# Patient Record
Sex: FEMALE | Race: WHITE | URBAN - METROPOLITAN AREA
[De-identification: names, ages, dates, MRNs, and addresses within clinical notes are randomized per-mention and may not be internally consistent; named-entity substitution may affect disease eponyms.]

---

## 2017-06-21 ENCOUNTER — APPOINTMENT (RX ONLY)
Dept: URBAN - METROPOLITAN AREA CLINIC 13 | Facility: CLINIC | Age: 72
Setting detail: DERMATOLOGY
End: 2017-06-21

## 2017-06-21 VITALS
HEIGHT: 67 IN | SYSTOLIC BLOOD PRESSURE: 104 MMHG | WEIGHT: 128 LBS | HEART RATE: 85 BPM | DIASTOLIC BLOOD PRESSURE: 70 MMHG

## 2017-06-21 DIAGNOSIS — L72.0 EPIDERMAL CYST: ICD-10-CM

## 2017-06-21 PROCEDURE — ? COUNSELING

## 2017-06-21 PROCEDURE — 11900 INJECT SKIN LESIONS </W 7: CPT

## 2017-06-21 PROCEDURE — 10060 I&D ABSCESS SIMPLE/SINGLE: CPT

## 2017-06-21 PROCEDURE — ? INTRALESIONAL KENALOG

## 2017-06-21 PROCEDURE — ? INCISION AND DRAINAGE

## 2017-06-21 ASSESSMENT — LOCATION ZONE DERM: LOCATION ZONE: NECK

## 2017-06-21 ASSESSMENT — LOCATION DETAILED DESCRIPTION DERM: LOCATION DETAILED: MID POSTERIOR NECK

## 2017-06-21 ASSESSMENT — LOCATION SIMPLE DESCRIPTION DERM: LOCATION SIMPLE: POSTERIOR NECK

## 2017-06-21 NOTE — PROCEDURE: MIPS QUALITY
Detail Level: Detailed
Quality 431: Preventive Care And Screening: Unhealthy Alcohol Use - Screening: Patient screened for unhealthy alcohol use using a single question and scores less than 2 times per year
Quality 47: Advance Care Plan: Advance Care Planning discussed and documented; advance care plan or surrogate decision maker documented in the medical record.
Quality 111:Pneumonia Vaccination Status For Older Adults: Pneumococcal Vaccination Previously Received
Quality 110: Preventive Care And Screening: Influenza Immunization: Influenza Immunization Administered during Influenza season
Quality 226: Preventive Care And Screening: Tobacco Use: Screening And Cessation Intervention: Patient screened for tobacco and is an ex-smoker
Quality 128: Preventive Care And Screening: Body Mass Index (Bmi) Screening And Follow-Up Plan: BMI is documented within normal parameters and no follow-up plan is required.

## 2017-06-21 NOTE — PROCEDURE: INTRALESIONAL KENALOG
Concentration Of Kenalog Solution Injected (Mg/Ml): 2.5
Expiration Date For Kenalog (Optional): 08/2018
Administered By (Optional): Dr. Snider
Medical Necessity Clause: This procedure was medically necessary because the lesions that were treated were:
Lot # For Kenalog (Optional): [id number]
Total Volume (Ccs): 0.5
Consent: The risks of atrophy were reviewed with the patient.
Kenalog Preparation: Kenalog
Include Z78.9 (Other Specified Conditions Influencing Health Status) As An Associated Diagnosis?: No
Detail Level: Detailed
X Size Of Lesion In Cm (Optional): 0

## 2017-06-21 NOTE — PROCEDURE: INCISION AND DRAINAGE
Suture Text: The incision was partially closed with
Detail Level: Detailed
Wound Care: Bacitracin
Lesion Type: Cyst
Size Of Lesion In Cm (Optional But May Be Required For Some Insurances): 0.8
Include Sutures?: No
Drainage Type?: bloody
Consent was obtained and risks were reviewed including but not limited to delayed wound healing, infection, need for multiple I and D's, and pain.
Dressing: pressure dressing with telfa
Epidermal Closure: simple interrupted
Render Postcare In Note?: Yes
Preparation Text: The area was prepped in the usual clean fashion.
Post-Care Instructions: I reviewed with the patient in detail post-care instructions. Leave bandage in place for 48 hours, then remove. Get area wet in shower and pull packing. Wash gently with antibacterial soap and water, pat dry and apply antibiotic ointment, cover with bandage. Repeat daily until healed. Call the office should any redness, pain, swelling or worsening occur.
Epidermal Sutures: 4-0 Ethilon
Method: 15 blade
Anesthesia Type: 1% lidocaine with epinephrine
Anesthesia Volume In Cc: 2

## 2017-06-21 NOTE — HPI: SKIN LESION
How Severe Is Your Skin Lesion?: mild
Has Your Skin Lesion Been Treated?: not been treated
Is This A New Presentation, Or A Follow-Up?: Skin Lesion
Additional History: \\n\\nPatient was prescribed cephalexin by Dr. Garcia 2 weeks ago, took it for 1 weeks.

## 2017-08-17 ENCOUNTER — APPOINTMENT (RX ONLY)
Dept: URBAN - METROPOLITAN AREA CLINIC 13 | Facility: CLINIC | Age: 72
Setting detail: DERMATOLOGY
End: 2017-08-17

## 2017-08-17 DIAGNOSIS — L72.0 EPIDERMAL CYST: ICD-10-CM

## 2017-08-17 PROCEDURE — 11421 EXC H-F-NK-SP B9+MARG 0.6-1: CPT

## 2017-08-17 PROCEDURE — ? PRESCRIPTION

## 2017-08-17 PROCEDURE — ? EXCISION

## 2017-08-17 RX ORDER — MUPIROCIN 20 MG/G
QS OINTMENT TOPICAL BID
Qty: 1 | Refills: 1 | COMMUNITY
Start: 2017-08-17

## 2017-08-17 RX ORDER — CHLORHEXIDINE GLUCONATE 213 G/1000ML
QS SOLUTION TOPICAL BID
Qty: 1 | Refills: 1 | COMMUNITY
Start: 2017-08-17

## 2017-08-17 RX ORDER — HYDROCODONE BITATRATE AND ACETAMINOPHEN 5; 325 MG/1; MG/1
1 TABLET ORAL PRN
Qty: 15 | Refills: 0 | COMMUNITY
Start: 2017-08-17

## 2017-08-17 RX ADMIN — CHLORHEXIDINE GLUCONATE QS: 213 SOLUTION TOPICAL at 14:23

## 2017-08-17 RX ADMIN — MUPIROCIN QS: 20 OINTMENT TOPICAL at 14:23

## 2017-08-17 RX ADMIN — HYDROCODONE BITATRATE AND ACETAMINOPHEN 1: 5; 325 TABLET ORAL at 14:23

## 2017-08-17 ASSESSMENT — LOCATION ZONE DERM: LOCATION ZONE: NECK

## 2017-08-17 ASSESSMENT — LOCATION SIMPLE DESCRIPTION DERM: LOCATION SIMPLE: POSTERIOR NECK

## 2017-08-17 ASSESSMENT — LOCATION DETAILED DESCRIPTION DERM: LOCATION DETAILED: MID POSTERIOR NECK

## 2017-08-17 NOTE — PROCEDURE: MIPS QUALITY
Detail Level: Detailed
Quality 111:Pneumonia Vaccination Status For Older Adults: Pneumococcal Vaccination Previously Received
Quality 431: Preventive Care And Screening: Unhealthy Alcohol Use - Screening: Patient screened for unhealthy alcohol use using a single question and scores less than 2 times per year
Quality 47: Advance Care Plan: Advance Care Planning discussed and documented; advance care plan or surrogate decision maker documented in the medical record.
Quality 110: Preventive Care And Screening: Influenza Immunization: Influenza Immunization Administered during Influenza season
Quality 128: Preventive Care And Screening: Body Mass Index (Bmi) Screening And Follow-Up Plan: BMI is documented within normal parameters and no follow-up plan is required.
Quality 226: Preventive Care And Screening: Tobacco Use: Screening And Cessation Intervention: Patient screened for tobacco and is an ex-smoker

## 2017-08-25 ENCOUNTER — APPOINTMENT (RX ONLY)
Dept: URBAN - METROPOLITAN AREA CLINIC 13 | Facility: CLINIC | Age: 72
Setting detail: DERMATOLOGY
End: 2017-08-25

## 2017-08-25 DIAGNOSIS — Z48.02 ENCOUNTER FOR REMOVAL OF SUTURES: ICD-10-CM

## 2017-08-25 PROCEDURE — ? SUTURE REMOVAL (GLOBAL PERIOD)

## 2017-08-25 ASSESSMENT — LOCATION ZONE DERM: LOCATION ZONE: NECK

## 2017-08-25 ASSESSMENT — LOCATION SIMPLE DESCRIPTION DERM: LOCATION SIMPLE: POSTERIOR NECK

## 2017-08-25 ASSESSMENT — LOCATION DETAILED DESCRIPTION DERM: LOCATION DETAILED: MID POSTERIOR NECK

## 2017-08-25 NOTE — PROCEDURE: SUTURE REMOVAL (GLOBAL PERIOD)
Add 75908 Cpt? (Important Note: In 2017 The Use Of 14981 Is Being Tracked By Cms To Determine Future Global Period Reimbursement For Global Periods): no
Detail Level: Simple